# Patient Record
Sex: MALE | Race: WHITE | NOT HISPANIC OR LATINO | Employment: FULL TIME | URBAN - METROPOLITAN AREA
[De-identification: names, ages, dates, MRNs, and addresses within clinical notes are randomized per-mention and may not be internally consistent; named-entity substitution may affect disease eponyms.]

---

## 2017-02-13 NOTE — PROGRESS NOTES
SUBJECTIVE:                                                    Te Hogue is a 41 year old male who presents to clinic today for the following health issues:    Depression Followup    Status since last visit: Improved     See PHQ-9 for current symptoms.  Other associated symptoms: None    Complicating factors:   Significant life event:  No   Current substance abuse:  None  Anxiety or Panic symptoms:  No    PHQ-9  English PHQ-9   Any Language        Amount of exercise or physical activity: None    Problems taking medications regularly: No    Medication side effects: none    Diet: regular (no restrictions)    Joint Pain     Onset: x 1 week    Description:   Location: left heel/ankle  Character: Sharp, Dull ache and Stabbing    Intensity: moderate, severe    Progression of Symptoms: same    Accompanying Signs & Symptoms:  Other symptoms: numbness and swelling   History:   Previous similar pain: no       Precipitating factors:   Trauma or overuse: no     Alleviating factors:  Improved by: nothing       Therapies Tried and outcome: ice, wrap, heat      Patient experiencing left heel pain for the past 1-2 months. He relates at onset the left heel pain was present only in the morning and would alleviate with walking. He adds that this pain has resolved. Patient reports over the last week, the pain has severely worsened and is present throughout the day. He relates that he is a  and is on his feet all shift long. He denies calf and ankle pain and injuring his heel.     Patient reports his mood to be stable. He notes he has suicidal ideation 1-2 times per week, but has no plan. He relates his safety plan includes contacting a family member. He has been following with a psychologist routinely. Had significant suicide attempt a couple years ago, feeling stable. Only passive SI.     Patient denies chest pain and shortness of breath.    He is currently using chewing tobacco. He uses 0.5-1 tins per day. He  relates that he has given up smoking tobacco and drinking alcohol.     Problem list and histories reviewed & adjusted, as indicated.  Additional history: as documented    Patient Active Problem List   Diagnosis     Moderate recurrent major depression (H)     CARDIOVASCULAR SCREENING; LDL GOAL LESS THAN 160     Hypertriglyceridemia     BMI 40.0-44.9, adult (H)     ALTHEA (obstructive sleep apnea)     Migraine with aura     Tobacco use disorder     Impaired fasting glucose     Past Surgical History   Procedure Laterality Date     Surgical history of -   1991,1997,1999     left knee X 3 , benign tumor removal and meniscus repari and ACL repair     Orthopedic surgery  1991       Social History   Substance Use Topics     Smoking status: Current Every Day Smoker     Packs/day: 0.50     Years: 15.00     Types: Dip, chew, snus or snuff     Start date: 1/1/1995     Smokeless tobacco: Current User     Last attempt to quit: 1/11/2013      Comment: 1 tin every 2 days     Alcohol use No      Comment: sober X 8 years     Family History   Problem Relation Age of Onset     Depression Mother      Hypertension Father      Lipids Father      CANCER Maternal Grandfather      lung cancer     DIABETES Maternal Grandfather      HEART DISEASE Paternal Grandfather      heart attack     Cardiovascular Paternal Grandfather      heart attack     C.A.D. Paternal Grandfather      Depression Brother      Depression Sister      Asthma Sister      DIABETES Maternal Grandmother      GASTROINTESTINAL DISEASE Maternal Grandmother      liver problems     CEREBROVASCULAR DISEASE No family hx of      Breast Cancer No family hx of      Cancer - colorectal No family hx of      Prostate Cancer No family hx of      Alzheimer Disease No family hx of      Arthritis No family hx of      Blood Disease No family hx of      Circulatory No family hx of      Eye Disorder No family hx of      Genitourinary Problems No family hx of      Musculoskeletal Disorder No family  "hx of      Neurologic Disorder No family hx of      Respiratory No family hx of      Thyroid Disease No family hx of          Current Outpatient Prescriptions   Medication Sig Dispense Refill     SUMAtriptan (IMITREX) 100 MG tablet Take 1 tablet (100 mg) by mouth at onset of headache for migraine May repeat in 2 hours if needed: max 2/day; average number of headaches monthly 4 9 tablet 1     Omega-3 Fatty Acids (OMEGA-3 FISH OIL PO)        Multiple Vitamin (DAILY MULTIVITAMIN PO) Take by mouth daily       Zinc 15 MG CAPS Take by mouth daily       omeprazole (PRILOSEC OTC) 20 MG tablet Take 20 mg by mouth daily.       lamoTRIgine (LAMICTAL) 100 MG tablet Reported on 2/17/2017  2     SUMAtriptan (IMITREX) 50 MG tablet Take 1 tablet (50 mg) by mouth at onset of headache for migraine May repeat dose in 2 hours.  Do not exceed 200 mg in 24 hours 18 tablet 1     Problem list, Medication list, Allergies, and Medical/Social/Surgical histories reviewed in [facility] and updated as appropriate.    ROS:  Constitutional, neuro, ENT, endocrine, pulmonary, cardiac, gastrointestinal, genitourinary, musculoskeletal, integument and psychiatric systems are negative, except as otherwise noted.    This document serves as a record of the services and decisions personally performed and made by Lynda Turpin DNP. It was created on her behalf by Geovanna oMya, a trained medical scribe. The creation of this document is based on the provider's statements to the medical scribe.  Geovanna Moya 3:32 PM February 17, 2017    OBJECTIVE:                                                    /80  Pulse 76  Temp 99.2  F (37.3  C) (Temporal)  Resp 18  Ht 5' 9.49\" (1.765 m)  Wt 297 lb 3.2 oz (134.8 kg)  BMI 43.27 kg/m2  Body mass index is 43.27 kg/(m^2).  GENERAL APPEARANCE: healthy, alert and no distress  MS: extremities normal- no gross deformities noted. Left heel: Full ROM, difficult with resisted plantar flexion, tender approximately 3-4cm " from insertion, negative homans sign, no swelling.  NEURO: Normal strength and tone, mentation intact and speech normal  PSYCH: mentation appears normal and affect normal/bright    Diagnostic test results:  No results found for this or any previous visit (from the past 24 hour(s)).     ASSESSMENT/PLAN:                                                        ICD-10-CM    1. Tobacco use disorder F17.200 TOBACCO CESSATION - FOR HEALTH MAINTENANCE   2. Severe recurrent major depressive disorder with psychotic features (H) F33.3 buPROPion (WELLBUTRIN XL) 300 MG 24 hr tablet   3. Achilles tendon pain M76.60          Discussed left heel pain and home cares. Recommended ice, ibuprofen, rest, wear arch support and do not walk barefooted. Patient will call if he needs a podiatry referral or orthotics. Can start with orthotic inserts initially.     Discussed mood symptoms. Reviewed safety plan when he does have suicidal ideation.     Encouraged patient to cease chewing tobacco. Consider other forms of nicotine.    Follow up with Provider - 6 months or as needed      The information in this document, created by a scribe for me, accurately reflects the services I personally performed and the decisions made by me. I have reviewed and approved this document for accuracy.     LUCIANO Umana [facility]

## 2017-02-17 ENCOUNTER — OFFICE VISIT (OUTPATIENT)
Dept: FAMILY MEDICINE | Facility: CLINIC | Age: 42
End: 2017-02-17
Payer: COMMERCIAL

## 2017-02-17 VITALS
RESPIRATION RATE: 18 BRPM | WEIGHT: 297.2 LBS | SYSTOLIC BLOOD PRESSURE: 138 MMHG | TEMPERATURE: 99.2 F | HEIGHT: 69 IN | DIASTOLIC BLOOD PRESSURE: 80 MMHG | HEART RATE: 76 BPM | BODY MASS INDEX: 44.02 KG/M2

## 2017-02-17 DIAGNOSIS — F33.3 SEVERE RECURRENT MAJOR DEPRESSIVE DISORDER WITH PSYCHOTIC FEATURES (H): ICD-10-CM

## 2017-02-17 DIAGNOSIS — F17.200 TOBACCO USE DISORDER: Primary | ICD-10-CM

## 2017-02-17 DIAGNOSIS — M76.60 ACHILLES TENDON PAIN: ICD-10-CM

## 2017-02-17 PROCEDURE — 99214 OFFICE O/P EST MOD 30 MIN: CPT | Performed by: NURSE PRACTITIONER

## 2017-02-17 RX ORDER — BUPROPION HYDROCHLORIDE 150 MG/1
TABLET ORAL
Qty: 110 TABLET | Refills: 1 | Status: CANCELLED | OUTPATIENT
Start: 2017-02-17

## 2017-02-17 ASSESSMENT — ANXIETY QUESTIONNAIRES
GAD7 TOTAL SCORE: 13
5. BEING SO RESTLESS THAT IT IS HARD TO SIT STILL: SEVERAL DAYS
6. BECOMING EASILY ANNOYED OR IRRITABLE: NEARLY EVERY DAY
1. FEELING NERVOUS, ANXIOUS, OR ON EDGE: SEVERAL DAYS
2. NOT BEING ABLE TO STOP OR CONTROL WORRYING: MORE THAN HALF THE DAYS
IF YOU CHECKED OFF ANY PROBLEMS ON THIS QUESTIONNAIRE, HOW DIFFICULT HAVE THESE PROBLEMS MADE IT FOR YOU TO DO YOUR WORK, TAKE CARE OF THINGS AT HOME, OR GET ALONG WITH OTHER PEOPLE: SOMEWHAT DIFFICULT
7. FEELING AFRAID AS IF SOMETHING AWFUL MIGHT HAPPEN: MORE THAN HALF THE DAYS
3. WORRYING TOO MUCH ABOUT DIFFERENT THINGS: MORE THAN HALF THE DAYS

## 2017-02-17 ASSESSMENT — PATIENT HEALTH QUESTIONNAIRE - PHQ9: 5. POOR APPETITE OR OVEREATING: MORE THAN HALF THE DAYS

## 2017-02-17 ASSESSMENT — PAIN SCALES - GENERAL: PAINLEVEL: EXTREME PAIN (8)

## 2017-02-17 NOTE — NURSING NOTE
"Chief Complaint   Patient presents with     Musculoskeletal Problem     Heel Pain x 1 week     Panel Management     FLu Shot, Tobacco Cessation, PHQ9       Initial /80 (BP Location: Left arm, Patient Position: Supine, Cuff Size: Adult Large)  Pulse 76  Temp 99.2  F (37.3  C) (Temporal)  Resp 18  Ht 5' 9.49\" (1.765 m)  Wt 297 lb 3.2 oz (134.8 kg)  BMI 43.27 kg/m2 Estimated body mass index is 43.27 kg/(m^2) as calculated from the following:    Height as of this encounter: 5' 9.49\" (1.765 m).    Weight as of this encounter: 297 lb 3.2 oz (134.8 kg).  Medication Reconciliation: complete  Marilu Mahajan CMA (AAMA)    "

## 2017-02-17 NOTE — MR AVS SNAPSHOT
"              After Visit Summary   2/17/2017    Te Hogue    MRN: [id number]           Patient Information     Date Of Birth          1975        Visit Information        Provider Department      2/17/2017 2:40 PM Lynda Turpin APRN CNP [facility]        Today's Diagnoses     Tobacco use disorder    -  1    Severe recurrent major depressive disorder with psychotic features (H)        Achilles tendon pain           Follow-ups after your visit        Who to contact     If you have questions or need follow up information about today's clinic visit or your schedule please contact [facility] at [phone].  Normal or non-critical lab and imaging results will be communicated to you by [employer], letter or phone within 4 business days after the clinic has received the results. If you do not hear from us within 7 days, please contact the clinic through [employer] or phone. If you have a critical or abnormal lab result, we will notify you by phone as soon as possible.  Submit refill requests through [email or web address] or call your pharmacy and they will forward the refill request to us. Please allow 3 business days for your refill to be completed.          Additional Information About Your Visit        MyChart Information     [email or web address] gives you secure access to your electronic health record. If you see a primary care provider, you can also send messages to your care team and make appointments. If you have questions, please call your primary care clinic.  If you do not have a primary care provider, please call [phone] and they will assist you.        Care EveryWhere ID     This is your Care EveryWhere ID. This could be used by other organizations to access your [place] medical records  [id number]        Your Vitals Were     Pulse Temperature Respirations Height BMI (Body Mass Index)       76 99.2  F (37.3  C) (Temporal) 18 5' 9.49\" (1.765 m) 43.27 kg/m2        Blood Pressure from " Last 3 Encounters:   02/17/17 138/80   07/15/16 136/80   11/09/15 130/80    Weight from Last 3 Encounters:   02/17/17 297 lb 3.2 oz (134.8 kg)   07/15/16 (!) 303 lb (137.4 kg)   11/09/15 281 lb 6.4 oz (127.6 kg)              We Performed the Following     TOBACCO CESSATION - FOR HEALTH MAINTENANCE          Today's Medication Changes          These changes are accurate as of: 2/17/17 11:59 PM.  If you have any questions, ask your nurse or doctor.               These medicines have changed or have updated prescriptions.        Dose/Directions    buPROPion 300 MG 24 hr tablet   Commonly known as:  WELLBUTRIN XL   This may have changed:    - medication strength  - how much to take  - how to take this  - when to take this  - additional instructions   Used for:  Severe recurrent major depressive disorder with psychotic features (H)   Changed by:  Lynda Turpin APRN CNP        Dose:  300 mg   Take 1 tablet (300 mg) by mouth every morning   Quantity:  90 tablet   Refills:  1       FLUoxetine 40 MG capsule   Commonly known as:  PROzac   This may have changed:    - medication strength  - how much to take  - how to take this  - when to take this  - additional instructions   Used for:  Severe recurrent major depressive disorder with psychotic features (H)   Changed by:  Lynda Turpin APRN CNP        Dose:  40 mg   Take 1 capsule (40 mg) by mouth daily   Quantity:  90 capsule   Refills:  1            Where to get your medicines      These medications were sent to [facility] - [place], [place] - [address]  [address] , [facility] [address]     Phone:  [phone]     buPROPion 300 MG 24 hr tablet    FLUoxetine 40 MG capsule                Primary Care Provider Office Phone # Fax #    Bailey Tawana Scott PA-C [phone]       XXX RESIGNED XXX [address]  [facility] [address]        Thank you!     Thank you for choosing [facility]  for your care. Our goal is always to  provide you with excellent care. Hearing back from our patients is one way we can continue to improve our services. Please take a few minutes to complete the written survey that you may receive in the mail after your visit with us. Thank you!             Your Updated Medication List - Protect others around you: Learn how to safely use, store and throw away your medicines at www.disposemymeds.org.          This list is accurate as of: 2/17/17 11:59 PM.  Always use your most recent med list.                   Brand Name Dispense Instructions for use    buPROPion 300 MG 24 hr tablet    WELLBUTRIN XL    90 tablet    Take 1 tablet (300 mg) by mouth every morning       DAILY MULTIVITAMIN PO      Take by mouth daily       FLUoxetine 40 MG capsule    PROzac    90 capsule    Take 1 capsule (40 mg) by mouth daily       lamoTRIgine 100 MG tablet    LaMICtal     Reported on 2/17/2017       OMEGA-3 FISH OIL PO          priLOSEC OTC 20 MG tablet   Generic drug:  omeprazole      Take 20 mg by mouth daily.       * SUMAtriptan 100 MG tablet    IMITREX    9 tablet    Take 1 tablet (100 mg) by mouth at onset of headache for migraine May repeat in 2 hours if needed: max 2/day; average number of headaches monthly 4       * SUMAtriptan 50 MG tablet    IMITREX    18 tablet    Take 1 tablet (50 mg) by mouth at onset of headache for migraine May repeat dose in 2 hours.  Do not exceed 200 mg in 24 hours       Zinc 15 MG Caps      Take by mouth daily       * Notice:  This list has 2 medication(s) that are the same as other medications prescribed for you. Read the directions carefully, and ask your doctor or other care provider to review them with you.

## 2017-02-18 ASSESSMENT — PATIENT HEALTH QUESTIONNAIRE - PHQ9: SUM OF ALL RESPONSES TO PHQ QUESTIONS 1-9: 9

## 2017-02-18 ASSESSMENT — ANXIETY QUESTIONNAIRES: GAD7 TOTAL SCORE: 13

## 2017-02-22 ENCOUNTER — MYC MEDICAL ADVICE (OUTPATIENT)
Dept: FAMILY MEDICINE | Facility: CLINIC | Age: 42
End: 2017-02-22

## 2017-02-22 DIAGNOSIS — M76.62 ACHILLES TENDINITIS OF LEFT LOWER EXTREMITY: Primary | ICD-10-CM

## 2017-02-22 RX ORDER — BUPROPION HYDROCHLORIDE 300 MG/1
300 TABLET ORAL EVERY MORNING
Qty: 90 TABLET | Refills: 1 | Status: SHIPPED | OUTPATIENT
Start: 2017-02-22 | End: 2017-09-12

## 2017-02-22 RX ORDER — FLUOXETINE 40 MG/1
40 CAPSULE ORAL DAILY
Qty: 90 CAPSULE | Refills: 1 | Status: SHIPPED | OUTPATIENT
Start: 2017-02-22 | End: 2017-09-12 | Stop reason: DRUGHIGH

## 2017-03-02 ENCOUNTER — OFFICE VISIT (OUTPATIENT)
Dept: PODIATRY | Facility: CLINIC | Age: 42
End: 2017-03-02
Payer: COMMERCIAL

## 2017-03-02 VITALS
DIASTOLIC BLOOD PRESSURE: 78 MMHG | WEIGHT: 297 LBS | HEART RATE: 70 BPM | OXYGEN SATURATION: 98 % | BODY MASS INDEX: 43.99 KG/M2 | HEIGHT: 69 IN | SYSTOLIC BLOOD PRESSURE: 130 MMHG

## 2017-03-02 DIAGNOSIS — M76.62 ACHILLES TENDINITIS OF LEFT LOWER EXTREMITY: Primary | ICD-10-CM

## 2017-03-02 PROCEDURE — 99203 OFFICE O/P NEW LOW 30 MIN: CPT | Performed by: PODIATRIST

## 2017-03-02 ASSESSMENT — PAIN SCALES - GENERAL: PAINLEVEL: MODERATE PAIN (4)

## 2017-03-02 NOTE — PATIENT INSTRUCTIONS
Thanks for coming today.  Ortho/Sports Medicine Clinic  [address] [place] [address]    To schedule future appointments in Ortho Clinic, you may call [phone].    To schedule ordered imaging by your provider:   Call [facility] [phone]    To schedule an injection ordered by your provider:  Call [facility] Injection scheduling line: [phone]  [employer] available online at:  [email or web address]    Please call if any further questions or concerns ([phone]).  Clinic hours 8 am to 5 pm.    Return to clinic (call) if symptoms worsen or fail to improve.

## 2017-03-02 NOTE — MR AVS SNAPSHOT
After Visit Summary   3/2/2017    Te Hogue    MRN: [id number]           Patient Information     Date Of Birth          1975        Visit Information        Provider Department      3/2/2017 2:00 PM Atif Ortega DPM [facility]        Today's Diagnoses     Achilles tendinitis of left lower extremity    -  1      Care Instructions    Thanks for coming today.  Ortho/Sports Medicine Clinic  [address] [place] [address]    To schedule future appointments in Ortho Clinic, you may call [phone].    To schedule ordered imaging by your provider:   Call [facility] [phone]    To schedule an injection ordered by your provider:  Call [facility] Injection scheduling line: [phone]  [employer] available online at:  [email or web address]    Please call if any further questions or concerns ([phone]).  Clinic hours 8 am to 5 pm.    Return to clinic (call) if symptoms worsen or fail to improve.          Follow-ups after your visit        Your next 10 appointments already scheduled     Mar 14, 2017  3:00 PM CDT   Return Visit with Atif Ortega DPM   [facility]    [address] [place] [address] [phone]              Who to contact     If you have questions or need follow up information about today's clinic visit or your schedule please contact [facility] directly at [phone].  Normal or non-critical lab and imaging results will be communicated to you by MyChart, letter or phone within 4 business days after the clinic has received the results. If you do not hear from us within 7 days, please contact the clinic through [employer] or phone. If you have a critical or abnormal lab result, we will notify you by phone as soon as possible.  Submit refill requests through [employer] or call your pharmacy and they will forward the refill request to  "us. Please allow 3 business days for your refill to be completed.          Additional Information About Your Visit        [employer] Information     [employer] gives you secure access to your electronic health record. If you see a primary care provider, you can also send messages to your care team and make appointments. If you have questions, please call your primary care clinic.  If you do not have a primary care provider, please call [phone] and they will assist you.      [employer] is an electronic gateway that provides easy, online access to your medical records. With [employer], you can request a clinic appointment, read your test results, renew a prescription or communicate with your care team.     To access your existing account, please contact your [facility] or call [phone] for assistance.        Care EveryWhere ID     This is your Care EveryWhere ID. This could be used by other organizations to access your [place] medical records  [id number]        Your Vitals Were     Pulse Height Pulse Oximetry BMI (Body Mass Index)          70 1.753 m (5' 9\") 98% 43.86 kg/m2         Blood Pressure from Last 3 Encounters:   03/02/17 130/78   02/17/17 138/80   07/15/16 136/80    Weight from Last 3 Encounters:   03/02/17 134.7 kg (297 lb)   02/17/17 134.8 kg (297 lb 3.2 oz)   07/15/16 (!) 137.4 kg (303 lb)              Today, you had the following     No orders found for display       Primary Care Provider Office Phone # Fax #    LUCIANO [facility] [phone]       [facility] [address] [facility] [address]        Thank you!     Thank you for choosing [facility]  for your care. Our goal is always to provide you with excellent care. Hearing back from our patients is one way we can continue to improve our services. Please take a few minutes to complete the written survey that you may receive in the mail after your visit with us. Thank " you!             Your Updated Medication List - Protect others around you: Learn how to safely use, store and throw away your medicines at www.disposemymeds.org.          This list is accurate as of: 3/2/17 11:59 PM.  Always use your most recent med list.                   Brand Name Dispense Instructions for use    buPROPion 300 MG 24 hr tablet    WELLBUTRIN XL    90 tablet    Take 1 tablet (300 mg) by mouth every morning       DAILY MULTIVITAMIN PO      Take by mouth daily       FLUoxetine 40 MG capsule    PROzac    90 capsule    Take 1 capsule (40 mg) by mouth daily       OMEGA-3 FISH OIL PO          priLOSEC OTC 20 MG tablet   Generic drug:  omeprazole      Take 20 mg by mouth daily.       * SUMAtriptan 100 MG tablet    IMITREX    9 tablet    Take 1 tablet (100 mg) by mouth at onset of headache for migraine May repeat in 2 hours if needed: max 2/day; average number of headaches monthly 4       * SUMAtriptan 50 MG tablet    IMITREX    18 tablet    Take 1 tablet (50 mg) by mouth at onset of headache for migraine May repeat dose in 2 hours.  Do not exceed 200 mg in 24 hours       Zinc 15 MG Caps      Take by mouth daily       * Notice:  This list has 2 medication(s) that are the same as other medications prescribed for you. Read the directions carefully, and ask your doctor or other care provider to review them with you.

## 2017-03-02 NOTE — NURSING NOTE
"Te Hogue's goals for this visit include: Evaluate left foot pain.  He requests these members of his care team be copied on today's visit information: yes    PCP: Lynda Turpin    Referring Provider:  No referring provider defined for this encounter.    Chief Complaint   Patient presents with     Consult     Left foot pain, x 1 month       Initial /78  Pulse 70  Ht 1.753 m (5' 9\")  Wt 134.7 kg (297 lb)  SpO2 98%  BMI 43.86 kg/m2 Estimated body mass index is 43.86 kg/(m^2) as calculated from the following:    Height as of this encounter: 1.753 m (5' 9\").    Weight as of this encounter: 134.7 kg (297 lb).  Medication Reconciliation: complete    "

## 2017-03-02 NOTE — PROGRESS NOTES
Past Medical History   Diagnosis Date     Alcohol dependence, in remission      quit in 2002     BMI 40.0-44.9, adult (H)      Depressive disorder      Moderate major depression (H)      inpt. X 1 age 18      ALTHEA (obstructive sleep apnea)      cpap     Tobacco abuse      Chews tobacco - 2 to 3 cans a week     Patient Active Problem List   Diagnosis     Moderate recurrent major depression (H)     CARDIOVASCULAR SCREENING; LDL GOAL LESS THAN 160     Hypertriglyceridemia     BMI 40.0-44.9, adult (H)     ALTHEA (obstructive sleep apnea)     Migraine with aura     Tobacco use disorder     Impaired fasting glucose     Past Surgical History   Procedure Laterality Date     Surgical history of -   1991,1997,1999     left knee X 3 , benign tumor removal and meniscus repari and ACL repair     Orthopedic surgery  1991     Social History     Social History     Marital status:      Spouse name: Bailey     Number of children: 0     Years of education: N/A     Occupational History     [employer]     Social History Main Topics     Smoking status: Current Every Day Smoker     Packs/day: 0.50     Years: 15.00     Types: Dip, chew, snus or snuff     Start date: 1/1/1995     Smokeless tobacco: Current User     Last attempt to quit: 1/11/2013      Comment: 1 tin every 2 days     Alcohol use No      Comment: sober X 8 years     Drug use: No     Sexual activity: Not Currently     Partners: Female     Birth control/ protection: Condom     Other Topics Concern     Parent/Sibling W/ Cabg, Mi Or Angioplasty Before 65f 55m? No     Social History Narrative     Family History   Problem Relation Age of Onset     Depression Mother      Hypertension Father      Lipids Father      CANCER Maternal Grandfather      lung cancer     DIABETES Maternal Grandfather      HEART DISEASE Paternal Grandfather      heart attack     Cardiovascular Paternal Grandfather      heart attack     C.A.D. Paternal Grandfather       Depression Brother      Depression Sister      Asthma Sister      DIABETES Maternal Grandmother      GASTROINTESTINAL DISEASE Maternal Grandmother      liver problems     CEREBROVASCULAR DISEASE No family hx of      Breast Cancer No family hx of      Cancer - colorectal No family hx of      Prostate Cancer No family hx of      Alzheimer Disease No family hx of      Arthritis No family hx of      Blood Disease No family hx of      Circulatory No family hx of      Eye Disorder No family hx of      Genitourinary Problems No family hx of      Musculoskeletal Disorder No family hx of      Neurologic Disorder No family hx of      Respiratory No family hx of      Thyroid Disease No family hx of      SUBJECTIVE FINDINGS:  This 41-year-old male presents in consultation from Lynda Turpin CNP for left Achilles tendon pain.  He relates about 1 month ago the Achilles tendon started hurting on the back of the left ankle.  He relates about 2 months ago he had left heel pain that was worse in the morning and better as the day went on but that resolved itself and the Achilles tendon hurts a little bit in the morning but it mostly hurts as the day goes on.  He relates he has iced and elevated it and no injury.  Rest makes it feel better.  He is on his feet as a  at work.       OBJECTIVE FINDINGS:  DP and PT are 2/4 bilaterally.  He has decreased ankle joint dorsiflexion bilaterally.  He has a flat foot type bilaterally.  He has no erythema, no drainage, no odor, no calor bilaterally, no gross tendon voids bilaterally.  He has pain on palpation along the Achilles tendon on the left.        ASSESSMENT AND PLAN:  Achilles tendinopathy left.  Diagnosis and treatment options discussed with the patient.  He is advised on stretching and icing.  Heel lift dispensed for right and left foot and use discussed with him, and he will return to clinic to see me in 2 weeks.

## 2017-07-07 ENCOUNTER — TRANSFERRED RECORDS (OUTPATIENT)
Dept: HEALTH INFORMATION MANAGEMENT | Facility: CLINIC | Age: 42
End: 2017-07-07

## 2017-09-08 ENCOUNTER — MYC MEDICAL ADVICE (OUTPATIENT)
Dept: FAMILY MEDICINE | Facility: CLINIC | Age: 42
End: 2017-09-08

## 2017-09-12 ENCOUNTER — OFFICE VISIT (OUTPATIENT)
Dept: FAMILY MEDICINE | Facility: CLINIC | Age: 42
End: 2017-09-12
Payer: COMMERCIAL

## 2017-09-12 ENCOUNTER — MYC MEDICAL ADVICE (OUTPATIENT)
Dept: FAMILY MEDICINE | Facility: CLINIC | Age: 42
End: 2017-09-12

## 2017-09-12 VITALS
DIASTOLIC BLOOD PRESSURE: 76 MMHG | RESPIRATION RATE: 16 BRPM | BODY MASS INDEX: 42.98 KG/M2 | HEIGHT: 69 IN | TEMPERATURE: 98.3 F | SYSTOLIC BLOOD PRESSURE: 128 MMHG | HEART RATE: 76 BPM | WEIGHT: 290.2 LBS

## 2017-09-12 DIAGNOSIS — F33.3 SEVERE RECURRENT MAJOR DEPRESSIVE DISORDER WITH PSYCHOTIC FEATURES (H): ICD-10-CM

## 2017-09-12 DIAGNOSIS — K21.9 GASTROESOPHAGEAL REFLUX DISEASE, ESOPHAGITIS PRESENCE NOT SPECIFIED: ICD-10-CM

## 2017-09-12 DIAGNOSIS — F33.1 MODERATE RECURRENT MAJOR DEPRESSION (H): ICD-10-CM

## 2017-09-12 DIAGNOSIS — G47.33 OSA (OBSTRUCTIVE SLEEP APNEA): ICD-10-CM

## 2017-09-12 DIAGNOSIS — F17.200 TOBACCO USE DISORDER: ICD-10-CM

## 2017-09-12 DIAGNOSIS — Z00.00 ENCOUNTER FOR ROUTINE ADULT HEALTH EXAMINATION WITHOUT ABNORMAL FINDINGS: Primary | ICD-10-CM

## 2017-09-12 DIAGNOSIS — N45.1 EPIDIDYMITIS, RIGHT: ICD-10-CM

## 2017-09-12 LAB
CHOLEST SERPL-MCNC: 187 MG/DL
GLUCOSE SERPL-MCNC: 95 MG/DL (ref 70–99)
HDLC SERPL-MCNC: 40 MG/DL
LDLC SERPL CALC-MCNC: 101 MG/DL
NONHDLC SERPL-MCNC: 147 MG/DL
TRIGL SERPL-MCNC: 232 MG/DL
TSH SERPL DL<=0.005 MIU/L-ACNC: 1.37 MU/L (ref 0.4–4)

## 2017-09-12 PROCEDURE — 80061 LIPID PANEL: CPT | Performed by: NURSE PRACTITIONER

## 2017-09-12 PROCEDURE — 82947 ASSAY GLUCOSE BLOOD QUANT: CPT | Performed by: NURSE PRACTITIONER

## 2017-09-12 PROCEDURE — 90471 IMMUNIZATION ADMIN: CPT | Performed by: NURSE PRACTITIONER

## 2017-09-12 PROCEDURE — 36415 COLL VENOUS BLD VENIPUNCTURE: CPT | Performed by: NURSE PRACTITIONER

## 2017-09-12 PROCEDURE — 99214 OFFICE O/P EST MOD 30 MIN: CPT | Mod: 25 | Performed by: NURSE PRACTITIONER

## 2017-09-12 PROCEDURE — 99396 PREV VISIT EST AGE 40-64: CPT | Mod: 25 | Performed by: NURSE PRACTITIONER

## 2017-09-12 PROCEDURE — 90715 TDAP VACCINE 7 YRS/> IM: CPT | Performed by: NURSE PRACTITIONER

## 2017-09-12 PROCEDURE — 84443 ASSAY THYROID STIM HORMONE: CPT | Performed by: NURSE PRACTITIONER

## 2017-09-12 RX ORDER — BUPROPION HYDROCHLORIDE 300 MG/1
300 TABLET ORAL EVERY MORNING
Qty: 90 TABLET | Refills: 1 | Status: SHIPPED | OUTPATIENT
Start: 2017-09-12

## 2017-09-12 ASSESSMENT — PATIENT HEALTH QUESTIONNAIRE - PHQ9
10. IF YOU CHECKED OFF ANY PROBLEMS, HOW DIFFICULT HAVE THESE PROBLEMS MADE IT FOR YOU TO DO YOUR WORK, TAKE CARE OF THINGS AT HOME, OR GET ALONG WITH OTHER PEOPLE: SOMEWHAT DIFFICULT
SUM OF ALL RESPONSES TO PHQ QUESTIONS 1-9: 10
SUM OF ALL RESPONSES TO PHQ QUESTIONS 1-9: 10

## 2017-09-12 ASSESSMENT — ANXIETY QUESTIONNAIRES
3. WORRYING TOO MUCH ABOUT DIFFERENT THINGS: SEVERAL DAYS
1. FEELING NERVOUS, ANXIOUS, OR ON EDGE: SEVERAL DAYS
GAD7 TOTAL SCORE: 5
6. BECOMING EASILY ANNOYED OR IRRITABLE: SEVERAL DAYS
2. NOT BEING ABLE TO STOP OR CONTROL WORRYING: SEVERAL DAYS
GAD7 TOTAL SCORE: 5
4. TROUBLE RELAXING: NOT AT ALL
GAD7 TOTAL SCORE: 5
7. FEELING AFRAID AS IF SOMETHING AWFUL MIGHT HAPPEN: SEVERAL DAYS
7. FEELING AFRAID AS IF SOMETHING AWFUL MIGHT HAPPEN: SEVERAL DAYS
5. BEING SO RESTLESS THAT IT IS HARD TO SIT STILL: NOT AT ALL

## 2017-09-12 ASSESSMENT — PAIN SCALES - GENERAL: PAINLEVEL: MODERATE PAIN (4)

## 2017-09-12 NOTE — PROGRESS NOTES
"Answers for HPI/ROS submitted by the patient on 9/12/2017   If you checked off any problems, how difficult have these problems made it for you to do your work, take care of things at home, or get along with other people?: Somewhat difficult  PHQ9 TOTAL SCORE: 10    Conflicting answers have been found for some questions. Please document the patient's answers manually. ***    SUBJECTIVE:   CC: Te Hogue is an 42 year old male who presents for preventative health visit.     Healthy Habits:    Do you get at least three servings of calcium containing foods daily (dairy, green leafy vegetables, etc.)? {YES/NO, DAIRY INTAKE:091554::\"yes\"}    Amount of exercise or daily activities, outside of work: {AMOUNT EXERCISE:267200}    Problems taking medications regularly {Yes /No default:549508::\"No\"}    Medication side effects: {Yes /No default.:928292::\"No\"}    Have you had an eye exam in the past two years? {YESNOBLANK:262685}    Do you see a dentist twice per year? {YESNOBLANK:774524}    Do you have sleep apnea, excessive snoring or daytime drowsiness?{YESNOBLANK:490336}    {Outside tests to abstract? :124752}    {additional problems to add (Optional):273620}    Today's PHQ-2 Score:   PHQ-2 ( 1999 Pfizer) 9/10/2017 2/17/2017   Q1: Little interest or pleasure in doing things 2 2   Q2: Feeling down, depressed or hopeless 3 2   PHQ-2 Score 5 4   Q1: Little interest or pleasure in doing things More than half the days -   Q2: Feeling down, depressed or hopeless Nearly every day -   PHQ-2 Score 5 -     {PHQ-2 LOOK IN ASSESSMENTS :806663}  Abuse: Current or Past(Physical, Sexual or Emotional)- {YES/NO/NA:850237}  Do you feel safe in your environment - {YES/NO/NA:361124}    Social History   Substance Use Topics     Smoking status: Current Every Day Smoker     Packs/day: 0.50     Years: 15.00     Types: Dip, chew, snus or snuff     Start date: 1/1/1995     Smokeless tobacco: Current User     Last attempt to quit: 1/11/2013 " "     Comment: 1 tin every 2 days     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 Reviewed health maintenance and updated orders accordingly - {Yes/No:198675::\"Yes\"}  {Chronicprobdata (Optional):415684}    Reviewed and updated as needed this visit by clinical staff  Tobacco  Allergies  Med Hx  Surg Hx  Fam Hx  Soc Hx        Reviewed and updated as needed this visit by Provider        {HISTORY OPTIONS (Optional):839701}    ROS:  {MALE ROS-adult preventive care package:270223::\"C: NEGATIVE for fever, chills, change in weight\",\"I: NEGATIVE for worrisome rashes, moles or lesions\",\"E: NEGATIVE for vision changes or irritation\",\"ENT: NEGATIVE for ear, mouth and throat problems\",\"R: NEGATIVE for significant cough or SOB\",\"CV: NEGATIVE for chest pain, palpitations or peripheral edema\",\"GI: NEGATIVE for nausea, abdominal pain, heartburn, or change in bowel habits\",\" male: negative for dysuria, hematuria, decreased urinary stream, erectile dysfunction, urethral discharge\",\"M: NEGATIVE for significant arthralgias or myalgia\",\"N: NEGATIVE for weakness, dizziness or paresthesias\",\"P: NEGATIVE for changes in mood or affect\"}    OBJECTIVE:   /76  Pulse 76  Temp 98.3  F (36.8  C) (Temporal)  Resp 16  Ht 5' 9.29\" (1.76 m)  Wt 290 lb 3.2 oz (131.6 kg)  BMI 42.5 kg/m2  EXAM:  {Exam Choices:417075}    ASSESSMENT/PLAN:   {Diag Picklist:272123}    COUNSELING:  {MALE COUNSELING MESSAGES:192974::\"Reviewed preventive health counseling, as reflected in patient instructions\"}    {BP Counseling- Complete if BP >= 120/80  (Optional):385563}     reports that he has been smoking Dip, chew, snus or snuff.  He started smoking about 22 years ago. He has a 7.50 pack-year smoking history. He uses smokeless tobacco.  {Tobacco Cessation -- Complete if patient is a smoker (Optional):124585}  Estimated body mass index is 42.5 kg/(m^2) as " "calculated from the following:    Height as of this encounter: 5' 9.29\" (1.76 m).    Weight as of this encounter: 290 lb 3.2 oz (131.6 kg).   {Weight Management Plan (ACO) Complete if BMI is abnormal-  Ages 18-64  BMI >24.9.  Age 65+ with BMI <23 or >30 (Optional):195235}    Counseling Resources:  ATP IV Guidelines  Pooled Cohorts Equation Calculator  FRAX Risk Assessment  ICSI Preventive Guidelines  Dietary Guidelines for Americans, 2010  USDA's MyPlate  ASA Prophylaxis  Lung CA Screening    LUCIANO Umana [facility]  "

## 2017-09-12 NOTE — PATIENT INSTRUCTIONS
Preventive Health Recommendations  Male Ages 40 to 49    Yearly exam:             See your health care provider every year in order to  o   Review health changes.   o   Discuss preventive care.    o   Review your medicines if your doctor has prescribed any.    You should be tested each year for STDs (sexually transmitted diseases) if you re at risk.     Have a cholesterol test every 5 years.     Have a colonoscopy (test for colon cancer) if someone in your family has had colon cancer or polyps before age 50.     After age 45, have a diabetes test (fasting glucose). If you are at risk for diabetes, you should have this test every 3 years.      Talk with your health care provider about whether or not a prostate cancer screening test (PSA) is right for you.    Shots: Get a flu shot each year. Get a tetanus shot every 10 years.     Nutrition:    Eat at least 5 servings of fruits and vegetables daily.     Eat whole-grain bread, whole-wheat pasta and brown rice instead of white grains and rice.     Talk to your provider about Calcium and Vitamin D.     Lifestyle    Exercise for at least 150 minutes a week (30 minutes a day, 5 days a week). This will help you control your weight and prevent disease.     Limit alcohol to one drink per day.     No smoking.     Wear sunscreen to prevent skin cancer.     See your dentist every six months for an exam and cleaning.      Preventive Health Recommendations  Male Ages 40 to 49    Yearly exam:             See your health care provider every year in order to  o   Review health changes.   o   Discuss preventive care.    o   Review your medicines if your doctor has prescribed any.    You should be tested each year for STDs (sexually transmitted diseases) if you re at risk.     Have a cholesterol test every 5 years.     Have a colonoscopy (test for colon cancer) if someone in your family has had colon cancer or polyps before age 50.     After age 45, have a diabetes test (fasting  glucose). If you are at risk for diabetes, you should have this test every 3 years.      Talk with your health care provider about whether or not a prostate cancer screening test (PSA) is right for you.    Shots: Get a flu shot each year. Get a tetanus shot every 10 years.     Nutrition:    Eat at least 5 servings of fruits and vegetables daily.     Eat whole-grain bread, whole-wheat pasta and brown rice instead of white grains and rice.     Talk to your provider about Calcium and Vitamin D.     Lifestyle    Exercise for at least 150 minutes a week (30 minutes a day, 5 days a week). This will help you control your weight and prevent disease.     Limit alcohol to one drink per day.     No smoking.     Wear sunscreen to prevent skin cancer.     See your dentist every six months for an exam and cleaning.      Preventive Health Recommendations  Male Ages 40 to 49    Yearly exam:             See your health care provider every year in order to  o   Review health changes.   o   Discuss preventive care.    o   Review your medicines if your doctor has prescribed any.    You should be tested each year for STDs (sexually transmitted diseases) if you re at risk.     Have a cholesterol test every 5 years.     Have a colonoscopy (test for colon cancer) if someone in your family has had colon cancer or polyps before age 50.     After age 45, have a diabetes test (fasting glucose). If you are at risk for diabetes, you should have this test every 3 years.      Talk with your health care provider about whether or not a prostate cancer screening test (PSA) is right for you.    Shots: Get a flu shot each year. Get a tetanus shot every 10 years.     Nutrition:    Eat at least 5 servings of fruits and vegetables daily.     Eat whole-grain bread, whole-wheat pasta and brown rice instead of white grains and rice.     Talk to your provider about Calcium and Vitamin D.     Lifestyle    Exercise for at least 150 minutes a week (30 minutes a  day, 5 days a week). This will help you control your weight and prevent disease.     Limit alcohol to one drink per day.     No smoking.     Wear sunscreen to prevent skin cancer.     See your dentist every six months for an exam and cleaning.

## 2017-09-12 NOTE — MR AVS SNAPSHOT
After Visit Summary   9/12/2017    Te Hogue    MRN: [id number]           Patient Information     Date Of Birth          1975        Visit Information        Provider Department      9/12/2017 2:20 PM Lynda Turpin APRN [facility] [place]        Today's Diagnoses     Encounter for routine adult health examination without abnormal findings    -  1    BMI 40.0-44.9, adult (H)        ALTHEA (obstructive sleep apnea)        Moderate recurrent major depression (H)        Tobacco use disorder        Severe recurrent major depressive disorder with psychotic features (H)        Epididymitis, right        Gastroesophageal reflux disease, esophagitis presence not specified          Care Instructions      Preventive Health Recommendations  Male Ages 40 to 49    Yearly exam:             See your health care provider every year in order to  o   Review health changes.   o   Discuss preventive care.    o   Review your medicines if your doctor has prescribed any.    You should be tested each year for STDs (sexually transmitted diseases) if you re at risk.     Have a cholesterol test every 5 years.     Have a colonoscopy (test for colon cancer) if someone in your family has had colon cancer or polyps before age 50.     After age 45, have a diabetes test (fasting glucose). If you are at risk for diabetes, you should have this test every 3 years.      Talk with your health care provider about whether or not a prostate cancer screening test (PSA) is right for you.    Shots: Get a flu shot each year. Get a tetanus shot every 10 years.     Nutrition:    Eat at least 5 servings of fruits and vegetables daily.     Eat whole-grain bread, whole-wheat pasta and brown rice instead of white grains and rice.     Talk to your provider about Calcium and Vitamin D.     Lifestyle    Exercise for at least 150 minutes a week (30 minutes a day, 5 days a week). This will help you control your weight and prevent  disease.     Limit alcohol to one drink per day.     No smoking.     Wear sunscreen to prevent skin cancer.     See your dentist every six months for an exam and cleaning.      Preventive Health Recommendations  Male Ages 40 to 49    Yearly exam:             See your health care provider every year in order to  o   Review health changes.   o   Discuss preventive care.    o   Review your medicines if your doctor has prescribed any.    You should be tested each year for STDs (sexually transmitted diseases) if you re at risk.     Have a cholesterol test every 5 years.     Have a colonoscopy (test for colon cancer) if someone in your family has had colon cancer or polyps before age 50.     After age 45, have a diabetes test (fasting glucose). If you are at risk for diabetes, you should have this test every 3 years.      Talk with your health care provider about whether or not a prostate cancer screening test (PSA) is right for you.    Shots: Get a flu shot each year. Get a tetanus shot every 10 years.     Nutrition:    Eat at least 5 servings of fruits and vegetables daily.     Eat whole-grain bread, whole-wheat pasta and brown rice instead of white grains and rice.     Talk to your provider about Calcium and Vitamin D.     Lifestyle    Exercise for at least 150 minutes a week (30 minutes a day, 5 days a week). This will help you control your weight and prevent disease.     Limit alcohol to one drink per day.     No smoking.     Wear sunscreen to prevent skin cancer.     See your dentist every six months for an exam and cleaning.      Preventive Health Recommendations  Male Ages 40 to 49    Yearly exam:             See your health care provider every year in order to  o   Review health changes.   o   Discuss preventive care.    o   Review your medicines if your doctor has prescribed any.    You should be tested each year for STDs (sexually transmitted diseases) if you re at risk.     Have a cholesterol test every 5  years.     Have a colonoscopy (test for colon cancer) if someone in your family has had colon cancer or polyps before age 50.     After age 45, have a diabetes test (fasting glucose). If you are at risk for diabetes, you should have this test every 3 years.      Talk with your health care provider about whether or not a prostate cancer screening test (PSA) is right for you.    Shots: Get a flu shot each year. Get a tetanus shot every 10 years.     Nutrition:    Eat at least 5 servings of fruits and vegetables daily.     Eat whole-grain bread, whole-wheat pasta and brown rice instead of white grains and rice.     Talk to your provider about Calcium and Vitamin D.     Lifestyle    Exercise for at least 150 minutes a week (30 minutes a day, 5 days a week). This will help you control your weight and prevent disease.     Limit alcohol to one drink per day.     No smoking.     Wear sunscreen to prevent skin cancer.     See your dentist every six months for an exam and cleaning.              Follow-ups after your visit        Additional Services     GASTROENTEROLOGY ADULT REF PROCEDURE ONLY       Last Lab Result: Creatinine (mg/dL)       Date                     Value                 05/19/2010               0.90             ----------  Body mass index is 42.5 kg/(m^2).      Patient will be contacted to schedule procedure.     Please be aware that coverage of these services is subject to the terms and limitations of your health insurance plan.  Call member services at your health plan with any benefit or coverage questions.  Any procedures must be performed at a [place] facility OR coordinated by your clinic's referral office.    Please bring the following with you to your appointment:    (1) Any X-Rays, CTs or MRIs which have been performed.  Contact the facility where they were done to arrange for  prior to your scheduled appointment.    (2) List of current medications   (3) This referral request   (4) Any  "documents/labs given to you for this referral                  Who to contact     If you have questions or need follow up information about today's clinic visit or your schedule please contact [facility] directly at [phone].  Normal or non-critical lab and imaging results will be communicated to you by MyChart, letter or phone within 4 business days after the clinic has received the results. If you do not hear from us within 7 days, please contact the clinic through MyChart or phone. If you have a critical or abnormal lab result, we will notify you by phone as soon as possible.  Submit refill requests through [employer] or call your pharmacy and they will forward the refill request to us. Please allow 3 business days for your refill to be completed.          Additional Information About Your Visit        [employer] Information     [employer] gives you secure access to your electronic health record. If you see a primary care provider, you can also send messages to your care team and make appointments. If you have questions, please call your primary care clinic.  If you do not have a primary care provider, please call [phone] and they will assist you.        Care EveryWhere ID     This is your Care EveryWhere ID. This could be used by other organizations to access your [facility] medical records  [id number]        Your Vitals Were     Pulse Temperature Respirations Height BMI (Body Mass Index)       76 98.3  F (36.8  C) (Temporal) 16 5' 9.29\" (1.76 m) 42.5 kg/m2        Blood Pressure from Last 3 Encounters:   09/12/17 128/76   03/02/17 130/78   02/17/17 138/80    Weight from Last 3 Encounters:   09/12/17 290 lb 3.2 oz (131.6 kg)   03/02/17 297 lb (134.7 kg)   02/17/17 297 lb 3.2 oz (134.8 kg)              We Performed the Following     ADMIN 1st VACCINE     DEPRESSION ACTION PLAN (DAP)     GASTROENTEROLOGY ADULT REF PROCEDURE ONLY     Glucose     Lipid panel reflex to direct LDL     OFFICE/OUTPT " VISIT,EST,LEVL IV     TDAP VACCINE (ADACEL)     TSH with free T4 reflex          Today's Medication Changes          These changes are accurate as of: 9/12/17 11:59 PM.  If you have any questions, ask your nurse or doctor.               These medicines have changed or have updated prescriptions.        Dose/Directions    FLUoxetine 20 MG capsule   Commonly known as:  PROzac   This may have changed:    - medication strength  - how much to take   Used for:  Moderate recurrent major depression (H)   Changed by:  Lynda Turpin APRN CNP        Dose:  60 mg   Take 3 capsules (60 mg) by mouth daily   Quantity:  270 capsule   Refills:  1            Where to get your medicines      These medications were sent to [facility] - [place], [place] - [address]  [address] [facility] [address]     Phone:  [phone]     buPROPion 300 MG 24 hr tablet    FLUoxetine 20 MG capsule                Primary Care Provider Office Phone # Fax #    LUCIANO Martin [phone] [address] [address]        Equal Access to Services     [facility]: Hadii aad ku hadasho Soomaali, waaxda luqadaha, qaybta kaalmada adeegyada, waxay idiin hayaan raleigh khorlin levy . So [facility] [phone].    ATENCIÓN: Si habla español, tiene a stover disposición servicios gratuitos de asistencia lingüística. [facility] [phone].    We comply with applicable federal civil rights laws and [place] laws. We do not discriminate on the basis of race, color, national origin, age, disability sex, sexual orientation or gender identity.            Thank you!     Thank you for choosing [facility]  for your care. Our goal is always to provide you with excellent care. Hearing back from our patients is one way we can continue to improve our services. Please take a few minutes to complete the written survey that you may receive in the mail after your visit with us. Thank you!             Your Updated  Medication List - Protect others around you: Learn how to safely use, store and throw away your medicines at www.disposemymeds.org.          This list is accurate as of: 9/12/17 11:59 PM.  Always use your most recent med list.                   Brand Name Dispense Instructions for use Diagnosis    buPROPion 300 MG 24 hr tablet    WELLBUTRIN XL    90 tablet    Take 1 tablet (300 mg) by mouth every morning    Severe recurrent major depressive disorder with psychotic features (H)       DAILY MULTIVITAMIN PO      Take by mouth daily    Fatigue       FLUoxetine 20 MG capsule    PROzac    270 capsule    Take 3 capsules (60 mg) by mouth daily    Moderate recurrent major depression (H)       OMEGA-3 FISH OIL PO           priLOSEC OTC 20 MG tablet   Generic drug:  omeprazole      Take 20 mg by mouth daily.        * SUMAtriptan 100 MG tablet    IMITREX    9 tablet    Take 1 tablet (100 mg) by mouth at onset of headache for migraine May repeat in 2 hours if needed: max 2/day; average number of headaches monthly 4    Migraine with aura       * SUMAtriptan 50 MG tablet    IMITREX    18 tablet    Take 1 tablet (50 mg) by mouth at onset of headache for migraine May repeat dose in 2 hours.  Do not exceed 200 mg in 24 hours    Migraine headache with aura       Zinc 15 MG Caps      Take by mouth daily    Acute URI, Fatigue       * Notice:  This list has 2 medication(s) that are the same as other medications prescribed for you. Read the directions carefully, and ask your doctor or other care provider to review them with you.

## 2017-09-12 NOTE — NURSING NOTE
"Chief Complaint   Patient presents with     Physical     Panel Management     tetanus, dap       Initial /76  Pulse 76  Temp 98.3  F (36.8  C) (Temporal)  Resp 16  Ht 5' 9.29\" (1.76 m)  Wt 290 lb 3.2 oz (131.6 kg)  BMI 42.5 kg/m2 Estimated body mass index is 42.5 kg/(m^2) as calculated from the following:    Height as of this encounter: 5' 9.29\" (1.76 m).    Weight as of this encounter: 290 lb 3.2 oz (131.6 kg).  Medication Reconciliation: complete   April BERTHA Villafana      "

## 2017-09-12 NOTE — NURSING NOTE
Screening Questionnaire for Adult Immunization    Are you sick today?   No   Do you have allergies to medications, food, a vaccine component or latex?   No   Have you ever had a serious reaction after receiving a vaccination?   No   Do you have a long-term health problem with heart disease, lung disease, asthma, kidney disease, metabolic disease (e.g. diabetes), anemia, or other blood disorder?   No   Do you have cancer, leukemia, HIV/AIDS, or any other immune system problem?   No   In the past 3 months, have you taken medications that affect  your immune system, such as prednisone, other steroids, or anticancer drugs; drugs for the treatment of rheumatoid arthritis, Crohn s disease, or psoriasis; or have you had radiation treatments?   No   Have you had a seizure, or a brain or other nervous system problem?   No   During the past year, have you received a transfusion of blood or blood     products, or been given immune (gamma) globulin or antiviral drug?   No   For women: Are you pregnant or is there a chance you could become        pregnant during the next month?   No   Have you received any vaccinations in the past 4 weeks?   No     Immunization questionnaire answers were all negative.        Per orders of Lynda Turpin, injection of Adacel given by Isabella Villafana. Patient instructed to remain in clinic for 15 minutes afterwards, and to report any adverse reaction to me immediately.       Screening performed by Isabella Villafana on 9/12/2017 at 4:49 PM.

## 2017-09-12 NOTE — LETTER
My Depression Action Plan  Name: Te Hogue   Date of Birth 1975  Date: 9/12/2017    My doctor: Lynda Turpin   My clinic: [facility]  [id number] [place] [address], [address]  [place] [id number] [phone]          GREEN    ZONE   Good Control    What it looks like:     Things are going generally well. You have normal up s and down s. You may even feel depressed from time to time, but bad moods usually last less than a day.   What you need to do:  1. Continue to care for yourself (see self care plan)  2. Check your depression survival kit and update it as needed  3. Follow your physician s recommendations including any medication.  4. Do not stop taking medication unless you consult with your physician first.           YELLOW         ZONE Getting Worse    What it looks like:     Depression is starting to interfere with your life.     It may be hard to get out of bed; you may be starting to isolate yourself from others.    Symptoms of depression are starting to last most all day and this has happened for several days.     You may have suicidal thoughts but they are not constant.   What you need to do:     1. Call your care team, your response to treatment will improve if you keep your care team informed of your progress. Yellow periods are signs an adjustment may need to be made.     2. Continue your self-care, even if you have to fake it!    3. Talk to someone in your support network    4. Open up your depression survival kit           RED    ZONE Medical Alert - Get Help    What it looks like:     Depression is seriously interfering with your life.     You may experience these or other symptoms: You can t get out of bed most days, can t work or engage in other necessary activities, you have trouble taking care of basic hygiene, or basic responsibilities, thoughts of suicide or death that will not go away, self-injurious behavior.     What you need to do:  1. Call your care team  and request a same-day appointment. If they are not available (weekends or after hours) call your local crisis line, emergency room or 911.      Electronically signed by: Isabella Villafana, September 12, 2017    Depression Self Care Plan / Survival Kit    Self-Care for Depression  Here s the deal. Your body and mind are really not as separate as most people think.  What you do and think affects how you feel and how you feel influences what you do and think. This means if you do things that people who feel good do, it will help you feel better.  Sometimes this is all it takes.  There is also a place for medication and therapy depending on how severe your depression is, so be sure to consult with your medical provider and/ or Behavioral Health Consultant if your symptoms are worsening or not improving.     In order to better manage my stress, I will:    Exercise  Get some form of exercise, every day. This will help reduce pain and release endorphins, the  feel good  chemicals in your brain. This is almost as good as taking antidepressants!  This is not the same as joining a gym and then never going! (they count on that by the way ) It can be as simple as just going for a walk or doing some gardening, anything that will get you moving.      Hygiene   Maintain good hygiene (Get out of bed in the morning, Make your bed, Brush your teeth, Take a shower, and Get dressed like you were going to work, even if you are unemployed).  If your clothes don't fit try to get ones that do.    Diet  I will strive to eat foods that are good for me, drink plenty of water, and avoid excessive sugar, caffeine, alcohol, and other mood-altering substances.  Some foods that are helpful in depression are: complex carbohydrates, B vitamins, flaxseed, fish or fish oil, fresh fruits and vegetables.    Psychotherapy  I agree to participate in Individual Therapy (if recommended).    Medication  If prescribed medications, I agree to take them.   Missing doses can result in serious side effects.  I understand that drinking alcohol, or other illicit drug use, may cause potential side effects.  I will not stop my medication abruptly without first discussing it with my provider.    Staying Connected With Others  I will stay in touch with my friends, family members, and my primary care provider/team.    Use your imagination  Be creative.  We all have a creative side; it doesn t matter if it s oil painting, sand castles, or mud pies! This will also kick up the endorphins.    Witness Beauty  (AKA stop and smell the roses) Take a look outside, even in mid-winter. Notice colors, textures. Watch the squirrels and birds.     Service to others  Be of service to others.  There is always someone else in need.  By helping others we can  get out of ourselves  and remember the really important things.  This also provides opportunities for practicing all the other parts of the program.    Humor  Laugh and be silly!  Adjust your TV habits for less news and crime-drama and more comedy.    Control your stress  Try breathing deep, massage therapy, biofeedback, and meditation. Find time to relax each day.     My support system    Clinic Contact:  Phone number:    Contact 1:  Phone number:    Contact 2:  Phone number:    Anglican/:  Phone number:    Therapist:  Phone number:    Local crisis center:    Phone number:    Other community support:  Phone number:

## 2017-09-12 NOTE — PROGRESS NOTES
SUBJECTIVE:   CC: Te Hogue is an 42 year old male who presents for preventative health visit.     Physical   Annual:     Getting at least 3 servings of Calcium per day::  NO    Bi-annual eye exam::  NO    Dental care twice a year::  Yes    Sleep apnea or symptoms of sleep apnea::  Sleep apnea    Diet::  Regular (no restrictions)    Frequency of exercise::  1 day/week    Duration of exercise::  15-30 minutes    Taking medications regularly::  Yes    Medication side effects::  None    Additional concerns today::  No    Mood  The patient explains that his mood has been okay recently, and states life is relatively good at this point. Depression is severe, chronic but well controlled for him currently.  He admits to passive suicidal ideations but no active suicidal thoughts. He relates that his depression is worse than his anxiety at this point. He also describes that his relationship with his wife is going well.     Sleep  Patient says he is wearing his CPAP device for his sleep apnea.     Weight  Patient explains that his weight has been the same. He Hasn't tried to lose and says that he will try to become more active and watch his diet.     GERD  The patient reports that he takes Prilosec for his GERD and thinks he would have heartburn without it.     MS   Patient states that he has leg pain while he is at work. He also reports knee pain that is manageable. In addition, he explains that he has also been experiencing an intermittent tingling sensation in his arms throughout the day. He relates that it rarely occurs in both arms at the same time, and denies any neck pain.        Patient notes discomfort in his groin/genital area. Which he describes as a pressure sensation. He denies swelling or redness, and explains that this discomfort presented a week ago.     Social  Patient claims that he is sober and that his current job as a  is going well. He still chews tobacco and is not interesting in  getting on a cessation plan to quit.     Labs/Vacc  Patient is fasting for labs today. He wants to receive his Tdap vaccination but defers a flu vaccination at this time.     Today's PHQ-2 Score:   PHQ-2 ( 1999 Pfizer) 9/10/2017   Q1: Little interest or pleasure in doing things 2   Q2: Feeling down, depressed or hopeless 3   PHQ-2 Score 5   Q1: Little interest or pleasure in doing things More than half the days   Q2: Feeling down, depressed or hopeless Nearly every day   PHQ-2 Score 5     Abuse: Current or Past(Physical, Sexual or Emotional)- No  Do you feel safe in your environment - Yes    Social History   Substance Use Topics     Smoking status: Current Every Day Smoker     Packs/day: 0.50     Years: 15.00     Types: Dip, chew, snus or snuff     Start date: 1/1/1995     Smokeless tobacco: Current User     Last attempt to quit: 1/11/2013      Comment: 1 tin every 2 days     Alcohol use No      Comment: sober X 8 years     The patient does not drink >3 drinks per day nor >7 drinks per week.    Last PSA: No results found for: PSA    Reviewed orders with patient. Reviewed health maintenance and updated orders accordingly - Yes  Labs reviewed in EPIC    Reviewed and updated as needed this visit by clinical staff  Tobacco  Allergies  Med Hx  Surg Hx  Fam Hx  Soc Hx        Reviewed and updated as needed this visit by Provider        Past Medical History:   Diagnosis Date     Alcohol dependence, in remission     quit in 2002     BMI 40.0-44.9, adult (H)      Depressive disorder      Moderate major depression (H)     inpt. X 1 age 18      ALTHEA (obstructive sleep apnea)     cpap     Tobacco abuse     Chews tobacco - 2 to 3 cans a week      Past Surgical History:   Procedure Laterality Date     ORTHOPEDIC SURGERY  1991     SURGICAL HISTORY OF -   1991,1997,1999    left knee X 3 , benign tumor removal and meniscus repari and ACL repair       ROS:  C: NEGATIVE for fever, chills, change in weight (see HPI above)  I:  "NEGATIVE for worrisome rashes, moles or lesions  E: NEGATIVE for vision changes or irritation  ENT: NEGATIVE for ear, mouth and throat problems  R: NEGATIVE for significant cough or SOB  CV: NEGATIVE for chest pain, palpitations or peripheral edema  GI: NEGATIVE for nausea, abdominal pain, or change in bowel habits POSITIVE hearburn   male: negative for dysuria, hematuria, decreased urinary stream, erectile dysfunction, urethral discharge (see HPI above)  M: NEGATIVE for significant arthralgias or myalgia (see HPI above)  N: NEGATIVE for weakness, dizziness or paresthesias  P: NEGATIVE for changes in mood or affect (See HPI above)     This document serves as a record of the services and decisions personally performed and made by Lynda Turpin DNP. It was created on her behalf by Rahul Wilson and Millicent Maguire, trained medical scribes. The creation of this document is based on the provider's statements to the medical scribe.  Rahul Wilson 3:15 PM September 12, 2017      OBJECTIVE:   /76  Pulse 76  Temp 98.3  F (36.8  C) (Temporal)  Resp 16  Ht 1.76 m (5' 9.29\")  Wt 131.6 kg (290 lb 3.2 oz)  BMI 42.5 kg/m2    EXAM:  GENERAL: healthy, alert and no distress  EYES: Eyes grossly normal to inspection, PERRL and conjunctivae and sclerae normal  HENT: crowded oropharynx otherwise ear canals and TM's normal, nose and mouth without ulcers or lesions  NECK: no adenopathy, no asymmetry, masses, or scars and thyroid normal to palpation  RESP: lungs clear to auscultation - no rales, rhonchi or wheezes  CV: regular rate and rhythm, normal S1 S2, no S3 or S4, no murmur, click or rub, no peripheral edema and peripheral pulses strong  ABDOMEN: soft, nontender, no hepatosplenomegaly, no masses and bowel sounds normal   (male): normal male genitalia without lesions or urethral discharge, no hernia  MS: no gross musculoskeletal defects noted, no edema  SKIN: no suspicious lesions or rashes  NEURO: Normal strength and " tone, mentation intact and speech normal  PSYCH: mentation appears normal, affect normal/bright      ASSESSMENT/PLAN:       ICD-10-CM    1. Encounter for routine adult health examination without abnormal findings Z00.00 TSH with free T4 reflex     Glucose     Lipid panel reflex to direct LDL   2. BMI 40.0-44.9, adult (H) Z68.41    3. ALTHEA (obstructive sleep apnea) G47.33    4. Moderate recurrent major depression (H) F33.1 FLUoxetine (PROZAC) 20 MG capsule   5. Tobacco use disorder F17.200    6. Severe recurrent major depressive disorder with psychotic features (H) F33.3 buPROPion (WELLBUTRIN XL) 300 MG 24 hr tablet   7. Epididymitis, right N45.1      Fasting labs for thyroid function, cholesterol levels, and blood glucose were ordered. Patient will be contacted when results are available.    Patient is advised healthy lifestyle and dietary habits to decrease BMI and reduce his risk for Diabetes.     Patient is told he should continue using CPAP for his sleep apnea, which is well-controlled at this point.     Current medication for depression is increased to 60mg of Prozac per day. Side effects of dosage increase were discussed and understood by patient. Order placed for Wellbutrin. Medication direction, dosage, and side effects discussed with patient.    Chewing tobacco cessation was recommended to patient, who is not interested at this time.     Endoscopy ordered for GERD. Patient was also advised to attempt to decrease dose of Prilosec, but he can stay on until his endoscopy since he has had problems without this medication in the past.     Medication plan of Ibuprofen 2-3 times a week for testicular pain was given, and patient was told  if pain doesn't alleviate or worsens he should return to the clinic for testicular ultrasound.     Tdap vaccination offered to the patient. Patient opts to receive the vaccination today and gave verbal consent for administration. Vaccination administered.     Compression socks were  "recommended to the patient for work to help alleviate his current leg pain.      COUNSELING:   Reviewed preventive health counseling, as reflected in patient instructions       Regular exercise       Healthy diet/nutrition       Vision screening       Hearing screening       Immunizations    Vaccinated for: TDAP           Prostate cancer screening    BP Screening:   Last 3 BP Readings:    BP Readings from Last 3 Encounters:   09/12/17 128/76   03/02/17 130/78   02/17/17 138/80     The following was recommended to the patient:  Re-screen BP within a year and recommended lifestyle modifications     reports that he has been smoking Dip, chew, snus or snuff.  He started smoking about 22 years ago. He has a 7.50 pack-year smoking history. He uses smokeless tobacco.  Tobacco Cessation Action Plan: Information offered: Patient not interested at this time  Estimated body mass index is 42.5 kg/(m^2) as calculated from the following:    Height as of this encounter: 1.76 m (5' 9.29\").    Weight as of this encounter: 131.6 kg (290 lb 3.2 oz).   Weight management plan: Discussed healthy diet and exercise guidelines and patient will follow up in 12 months in clinic to re-evaluate.    Counseling Resources:  ATP IV Guidelines  Pooled Cohorts Equation Calculator  FRAX Risk Assessment  ICSI Preventive Guidelines  Dietary Guidelines for Americans, 2010  USDA's MyPlate  ASA Prophylaxis  Lung CA Screening    Follow-up: Virtual visit in 3-4 weeks for Prozac                    Patient will return to clinic if testicular pain worsens                    Follow-up after endoscopy for GERD      The information in this document, created by the medical scribe for me, accurately reflects the services I personally performed and the decisions made by me. I have reviewed and approved this document for accuracy prior to leaving the patient care area.  September 12, 2017 2:44 PM    LUCIANO Umana [facility] ROMEL  Answers for HPI/ROS " submitted by the patient on 9/12/2017   If you checked off any problems, how difficult have these problems made it for you to do your work, take care of things at home, or get along with other people?: Somewhat difficult  PHQ9 TOTAL SCORE: 10    Conflicting answers have been found for some questions. Please document the patient's answers manually.

## 2017-09-13 ENCOUNTER — HOSPITAL ENCOUNTER (OUTPATIENT)
Facility: AMBULATORY SURGERY CENTER | Age: 42
End: 2017-09-13
Attending: SPECIALIST | Admitting: SPECIALIST
Payer: COMMERCIAL

## 2017-09-13 ASSESSMENT — ANXIETY QUESTIONNAIRES: GAD7 TOTAL SCORE: 5

## 2017-09-13 ASSESSMENT — PATIENT HEALTH QUESTIONNAIRE - PHQ9: SUM OF ALL RESPONSES TO PHQ QUESTIONS 1-9: 10

## 2018-03-02 ENCOUNTER — TELEPHONE (OUTPATIENT)
Dept: FAMILY MEDICINE | Facility: CLINIC | Age: 43
End: 2018-03-02

## 2018-03-02 NOTE — TELEPHONE ENCOUNTER
Summary:    Patient is due/failing the following:   PHQ9    Action needed:   Patient needs to do PHQ9.    Type of outreach:    Sent [employer] message.    Questions for provider review:    None                                                                                                                                    Echo Jama     Chart routed to Care Team .        Panel Management Review      Patient has the following on his problem list:     Depression / Dysthymia review    Measure:  Needs PHQ-9 score of 4 or less during index window.  Administer PHQ-9 and if score is 5 or more, send encounter to provider for next steps.        PHQ-9 SCORE 2/17/2017 9/10/2017 9/12/2017   Total Score - - -   Total Score MyChart - 12 (Moderate depression) 10 (Moderate depression)   Total Score 9 12 10       If PHQ-9 recheck is 5 or more, route to provider for next steps.    Patient is due for:  PHQ9      Composite cancer screening  Chart review shows that this patient is due/due soon for the following None

## 2018-04-03 NOTE — TELEPHONE ENCOUNTER
Left message for patient to call clinic. Patient is due for an OV for depression follow up and update PHQ9.    Please assist patient     Echo Jama

## 2020-02-08 ENCOUNTER — HEALTH MAINTENANCE LETTER (OUTPATIENT)
Age: 45
End: 2020-02-08

## 2020-11-07 ENCOUNTER — HEALTH MAINTENANCE LETTER (OUTPATIENT)
Age: 45
End: 2020-11-07

## 2021-03-27 ENCOUNTER — HEALTH MAINTENANCE LETTER (OUTPATIENT)
Age: 46
End: 2021-03-27

## 2021-09-05 ENCOUNTER — HEALTH MAINTENANCE LETTER (OUTPATIENT)
Age: 46
End: 2021-09-05

## 2022-04-17 ENCOUNTER — HEALTH MAINTENANCE LETTER (OUTPATIENT)
Age: 47
End: 2022-04-17

## 2022-10-29 ENCOUNTER — HEALTH MAINTENANCE LETTER (OUTPATIENT)
Age: 47
End: 2022-10-29

## 2023-02-28 ENCOUNTER — HOSPITAL ENCOUNTER (EMERGENCY)
Facility: CLINIC | Age: 48
Discharge: HOME | End: 2023-02-28
Attending: EMERGENCY MEDICINE | Admitting: EMERGENCY MEDICINE
Payer: COMMERCIAL

## 2023-02-28 VITALS — HEART RATE: 93 BPM | DIASTOLIC BLOOD PRESSURE: 111 MMHG | SYSTOLIC BLOOD PRESSURE: 196 MMHG | OXYGEN SATURATION: 100 %

## 2023-02-28 DIAGNOSIS — F32.89 OTHER DEPRESSION: ICD-10-CM

## 2023-02-28 LAB — SARS-COV-2 RNA RESP QL NAA+PROBE: NEGATIVE

## 2023-02-28 PROCEDURE — 90791 PSYCH DIAGNOSTIC EVALUATION: CPT

## 2023-02-28 PROCEDURE — U0005 INFEC AGEN DETEC AMPLI PROBE: HCPCS | Performed by: EMERGENCY MEDICINE

## 2023-02-28 PROCEDURE — 99285 EMERGENCY DEPT VISIT HI MDM: CPT | Mod: 25

## 2023-02-28 PROCEDURE — C9803 HOPD COVID-19 SPEC COLLECT: HCPCS

## 2023-02-28 ASSESSMENT — ACTIVITIES OF DAILY LIVING (ADL): ADLS_ACUITY_SCORE: 35

## 2023-03-01 ASSESSMENT — COLUMBIA-SUICIDE SEVERITY RATING SCALE - C-SSRS
LETHALITY/MEDICAL DAMAGE CODE FIRST POTENTIAL ATTEMPT: BEHAVIOR LIKELY TO RESULT IN INJURY BUT NOT LIKELY TO CAUSE DEATH
REASONS FOR IDEATION PAST MONTH: MOSTLY TO END OR STOP THE PAIN (YOU COULDN'T GO ON LIVING WITH THE PAIN OR HOW YOU WERE FEELING)
LETHALITY/MEDICAL DAMAGE CODE MOST RECENT POTENTIAL ATTEMPT: BEHAVIOR LIKELY TO RESULT IN INJURY BUT NOT LIKELY TO CAUSE DEATH
2. HAVE YOU ACTUALLY HAD ANY THOUGHTS OF KILLING YOURSELF?: YES
TOTAL  NUMBER OF INTERRUPTED ATTEMPTS LIFETIME: NO
1. IN THE PAST MONTH, HAVE YOU WISHED YOU WERE DEAD OR WISHED YOU COULD GO TO SLEEP AND NOT WAKE UP?: YES
6. HAVE YOU EVER DONE ANYTHING, STARTED TO DO ANYTHING, OR PREPARED TO DO ANYTHING TO END YOUR LIFE?: NO
2. HAVE YOU ACTUALLY HAD ANY THOUGHTS OF KILLING YOURSELF?: YES
LETHALITY/MEDICAL DAMAGE CODE MOST LETHAL ACTUAL ATTEMPT: MINOR PHYSICAL DAMAGE
TOTAL  NUMBER OF ACTUAL ATTEMPTS LIFETIME: 1
4. HAVE YOU HAD THESE THOUGHTS AND HAD SOME INTENTION OF ACTING ON THEM?: YES
5. HAVE YOU STARTED TO WORK OUT OR WORKED OUT THE DETAILS OF HOW TO KILL YOURSELF? DO YOU INTEND TO CARRY OUT THIS PLAN?: YES
REASONS FOR IDEATION LIFETIME: MOSTLY TO END OR STOP THE PAIN (YOU COULDN'T GO ON LIVING WITH THE PAIN OR HOW YOU WERE FEELING)
2. HAVE YOU ACTUALLY HAD ANY THOUGHTS OF KILLING YOURSELF?: JUMP FROM BRIDGE
1. HAVE YOU WISHED YOU WERE DEAD OR WISHED YOU COULD GO TO SLEEP AND NOT WAKE UP?: YES
TOTAL  NUMBER OF ABORTED OR SELF INTERRUPTED ATTEMPTS LIFETIME: NO
LETHALITY/MEDICAL DAMAGE CODE MOST RECENT ACTUAL ATTEMPT: MINOR PHYSICAL DAMAGE
3. HAVE YOU BEEN THINKING ABOUT HOW YOU MIGHT KILL YOURSELF?: YES
5. HAVE YOU STARTED TO WORK OUT OR WORKED OUT THE DETAILS OF HOW TO KILL YOURSELF? DO YOU INTEND TO CARRY OUT THIS PLAN?: NO
LETHALITY/MEDICAL DAMAGE CODE FIRST ACTUAL ATTEMPT: MINOR PHYSICAL DAMAGE
LETHALITY/MEDICAL DAMAGE CODE MOST LETHAL POTENTIAL ATTEMPT: BEHAVIOR LIKELY TO RESULT IN INJURY BUT NOT LIKELY TO CAUSE DEATH
ATTEMPT PAST THREE MONTHS: NO
4. HAVE YOU HAD THESE THOUGHTS AND HAD SOME INTENTION OF ACTING ON THEM?: NO
ATTEMPT LIFETIME: YES
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
## 2023-03-01 NOTE — DISCHARGE INSTRUCTIONS
"Follow-up as recommended by our .  Continue your current medications.  If you ever do not feel safe, call 911.    Shelby Tiwari,    I'm glad you were brought to the emergency department today. Your worries about finances and relationships seems like it is growing, and your fleeting thoughts about killing yourself is a way that your mind is telling you that you need support. Those thoughts may continue, which is why supports are very important.    I have 3 suggestions for you:    1) you can meet with a therapist at the walk-in clinic. It's free for up to 6 sessions. Their phone number is [phone] and their website is https://[email or web address].uk.EZDOCTOR    2) you could reconnect with [facility] for therapy and psychiatry. Their website is [email or web address].[email or web address]    3) you could talk to your prescribing doctor at [facility] and tell them you would like to meet with a therapist. They can schedule you in their system.    As we discussed, I assess you as moderate risk for suicide. It is important to seek support. The fleeting thoughts will get stronger without supports.     The sara we discussed to meet others for various groups is called the \"meetup\" sara. They have groups for singles to go to events, groups to play cards and board games, groups to go to concerts, etc. This might be a safe way for you to meet some people.    And remember, if your thoughts about suicide continue to come back, you can come back to the [facility] Emergency Department and come to the EmPATH Unit, where you can meet with a psychiatrist right away. It's not an inpatient program and people usually stay one night, then go home. It allows you to meet a psychiatrist and get immediate help.    You can also call the Crisis Line in [facility] at [phone]. That number will be listed below, along with some potential resources.    Best wishes to you Te. I like your plan to visit all of the baseball stadiums!    Landon Bull, " "LMFT      Aftercare Plan  If I am feeling unsafe or I am in a crisis, I will:   Contact my established care providers   Call the National Suicide Prevention Lifeline: 988  Go to the nearest emergency room   Call 911     Warning signs that I or other people might notice when a crisis is developing for me: More anxious    Things I am able to do on my own to cope or help me feel better: Listen to music, read, watch tv, call sister, call Crisis Line     Things that I am able to do with others to cope or help me better: Meet with  to talk     Things I can use or do for distraction: Music, sporting events, read    Changes I can make to support my mental health and wellness: Re-start therapy! Try a meetup group.    People in my life that I can ask for help: Sister, .    Your [facility] has a mental health crisis team you can call 24/7: [facility] Crisis  [phone]        Crisis Lines  Crisis Text Line  Text 550846  You will be connected with a trained live crisis counselor to provide support.      Community Resources  Fast Tracker  Linking people to mental health and substance use disorder resources  DatacticsckJuly Systemsn.org     [place] Mental Health Warm Line  Peer to peer support  Monday thru Saturday, 12 pm to 10 pm  672.709.6396 or 4.140.557.9041  Text \"Support\" to 00877    National [place] [employer] Mental Illness ([place])  212.455.4730 or 1.888.[place]LY.HELPS      Mental Health Apps  My3  https://myBomgarpp.org/    VirtualHopeBox  https://[employer].org/apps/virtual-hope-box/      Additional Information  Today you were seen by a licensed mental health professional through Triage and Transition services, [facility])  for a crisis assessment in the Emergency Department at [facility].  It is recommended that you follow up with your established providers (psychiatrist, mental health therapist, and/or primary care doctor - as relevant) as soon as possible. " Coordinators from [facility] will be calling you in the next 24-48 hours to ensure that you have the resources you need.  You can also contact [facility] coordinators directly at [phone]. You may have been scheduled for or offered an appointment with a mental health provider. [facility] maintains an extensive network of licensed behavioral health providers to connect patients with the services they need.  We do not charge providers a fee to participate in our referral network.  We match patients with providers based on a patient's specific needs, insurance coverage, and location.  Our first effort will be to refer you to a provider within your care system, and will utilize providers outside your care system as needed.

## 2023-03-01 NOTE — ED PROVIDER NOTES
History     Chief Complaint:  Psychiatric Evaluation       HPI   Te Hogue is a 47 year old male with a history of depression and anxiety who presents by EMS after he apparently had texted to his sister that he had some suicidal thoughts.  He had called the crisis line but had already texted her and so she called the police because she was concerned about him and he was brought here.  He said he does not have a plan, they were just some fleeting thoughts tonight and he was just needing to talk to someone.  He is on Wellbutrin and was recently started on a new anxiety medicine as well.  He does not currently have a psychiatrist or a therapist but his primary doctor got him the medication.  He said he has poor insurance and cannot afford to go to therapy at this time.  He does struggle with depression.  He has not missed his medications.  He has been sober from alcohol for over 20 years.  He lives with his parents currently gets along with them well and he works as a  at a fine restaurant and makes good tips.  He denies having any active suicidal thoughts at this time.  He has been sleeping good but does not always eat well but really overall he said he is doing pretty good.  He would like to be able to get into some type of therapy as he needs someone to talk to.  He did make a suicide attempt with an overdose of some pain medication in 2012 but he states that he waited too long to get help and he hit bottom.  Now that he knows we have empath and people will listen and he will be put immediately on a hold, he knows that if he gets worse he will come to the emergency room.  He has not been physically sick recently.      Independent Historian:   None - Patient Only    Review of External Notes: I reviewed his notes from [facility] in 2018    ROS:  Review of Systems    Allergies:  No Known Drug Allergies     Medications:    buPROPion (WELLBUTRIN XL) 300 MG 24 hr tablet  FLUoxetine (PROZAC) 20 MG  capsule  Multiple Vitamin (DAILY MULTIVITAMIN PO)  Omega-3 Fatty Acids (OMEGA-3 FISH OIL PO)  omeprazole (PRILOSEC OTC) 20 MG tablet  SUMAtriptan (IMITREX) 100 MG tablet  SUMAtriptan (IMITREX) 50 MG tablet  Zinc 15 MG CAPS        Past Medical History:    Past Medical History:   Diagnosis Date     Alcohol dependence, in remission      BMI 40.0-44.9, adult (H)      Depressive disorder      Moderate major depression (H)      ALTHEA (obstructive sleep apnea)      Tobacco abuse        Past Surgical History:    Past Surgical History:   Procedure Laterality Date     ORTHOPEDIC SURGERY  1991     SURGICAL HISTORY OF -   1991,1997,1999    left knee X 3 , benign tumor removal and meniscus repari and ACL repair        Family History:    family history includes Asthma in his sister; C.A.D. in his paternal grandfather; Cancer in his maternal grandfather; Cardiovascular in his paternal grandfather; Depression in his brother, mother, and sister; Diabetes in his maternal grandfather and maternal grandmother; Gastrointestinal Disease in his maternal grandmother; Heart Disease in his paternal grandfather; Hypertension in his father; Lipids in his father.    Social History:   reports that he has been smoking dip, chew, snus or snuff. He started smoking about 28 years ago. He has a 7.50 pack-year smoking history. He uses smokeless tobacco. He reports that he does not drink alcohol and does not use drugs.  PCP: No Ref-Primary, Physician     Physical Exam     Patient Vitals for the past 24 hrs:   BP Pulse SpO2   02/28/23 2123 (!) 196/111 93 100 %        Physical Exam  Nursing note and vitals reviewed.  Constitutional:  Appears comfortable.   HENT:    Mucus membranes are moist.  Eyes:    Conjunctivae are normal. No jaundice.  Pupils equal  Cardiovascular:  Normal rate, regular rhythm.      Normal heart sounds.     No murmur heard.  Pulmonary/Chest:  Breath sounds clear. No respiratory distress.     No stridor.   Musculoskeletal: No edema.   Moves all extremities equally.  Neurological:   Alert and appropriate. No focal weakness.  Gait is normal.  Skin:    Skin is warm and dry. No rash noted. No diaphoresis.   Psychiatric:   Patient has a normal mood and affect, makes good eye contact.  Denies hallucinations is not psychotic or delusional.  Denies suicidal ideation.      Emergency Department Course       Laboratory:  Labs Ordered and Resulted from Time of ED Arrival to Time of ED Departure   COVID-19 VIRUS (CORONAVIRUS) BY PCR - Normal       Result Value    SARS CoV2 PCR Negative              Emergency Department Course & Assessments:    PSS-3    Date and Time Over the past 2 weeks have you felt down, depressed, or hopeless? Over the past 2 weeks have you had thoughts of killing yourself? Have you ever attempted to kill yourself? When did this last happen? User   02/28/23 2124 yes yes yes more than 6 months ago MA              Suicide assessment completed by mental health (D.E.C., LCSW, etc.)    Interventions:  Medications - No data to display       Consultations/Discussion of Management or Tests:  I talked with the DEC .       Social Determinants of Health affecting care:   None      Disposition:  The patient was discharged to home.     Impression & Plan        Medical Decision Making:  Patient comes in after he had made a suicidal thought text to his sister.  He said it was a mistake and he just needed to talk to someone he did talk with somebody at the crisis line.  He denies active suicidal ideation.  DEC saw the patient and agrees that this patient is safe to be discharged home and will get him some recommendation for some free therapy so we can get him to talk with someone.  He will continue his current medication.  I do not feel he is an immediate danger to himself or others and the patient feels very comfortable now coming to the emergency room if he gets worse or has more serious suicidal thoughts.  He likes his job, he likes living  with his parents at this time and gets along well with them.  I feel comfortable discharging him home.    Follow-up as recommended by our .  Continue your current medications.  If you ever do not feel safe, call 911.    Diagnosis:    ICD-10-CM    1. Other depression  F32.89            Discharge Medications:  New Prescriptions    No medications on file          Karla Rogers MD  2/28/2023   Karla Rogers MD Powell, Tracy Alan, MD  02/28/23 8998

## 2023-06-24 ENCOUNTER — HEALTH MAINTENANCE LETTER (OUTPATIENT)
Age: 48
End: 2023-06-24

## 2024-08-17 ENCOUNTER — HEALTH MAINTENANCE LETTER (OUTPATIENT)
Age: 49
End: 2024-08-17